# Patient Record
Sex: FEMALE | Race: WHITE | URBAN - METROPOLITAN AREA
[De-identification: names, ages, dates, MRNs, and addresses within clinical notes are randomized per-mention and may not be internally consistent; named-entity substitution may affect disease eponyms.]

---

## 2020-02-27 RX ORDER — DEXTROAMPHETAMINE SACCHARATE, AMPHETAMINE ASPARTATE MONOHYDRATE, DEXTROAMPHETAMINE SULFATE AND AMPHETAMINE SULFATE 5; 5; 5; 5 MG/1; MG/1; MG/1; MG/1
20 CAPSULE, EXTENDED RELEASE ORAL 2 TIMES DAILY
COMMUNITY

## 2020-02-27 ASSESSMENT — MIFFLIN-ST. JEOR: SCORE: 1416.74

## 2020-02-28 ENCOUNTER — ANESTHESIA EVENT (OUTPATIENT)
Dept: SURGERY | Facility: AMBULATORY SURGERY CENTER | Age: 25
End: 2020-02-28

## 2020-03-05 ENCOUNTER — HOSPITAL ENCOUNTER (OUTPATIENT)
Facility: AMBULATORY SURGERY CENTER | Age: 25
Discharge: HOME | End: 2020-03-05
Attending: PLASTIC SURGERY | Admitting: PLASTIC SURGERY

## 2020-03-05 ENCOUNTER — ANESTHESIA (OUTPATIENT)
Dept: SURGERY | Facility: AMBULATORY SURGERY CENTER | Age: 25
End: 2020-03-05
Payer: COMMERCIAL

## 2020-03-05 VITALS
DIASTOLIC BLOOD PRESSURE: 82 MMHG | TEMPERATURE: 98 F | HEART RATE: 108 BPM | RESPIRATION RATE: 16 BRPM | BODY MASS INDEX: 19.99 KG/M2 | HEIGHT: 69 IN | SYSTOLIC BLOOD PRESSURE: 125 MMHG | WEIGHT: 135 LBS | OXYGEN SATURATION: 100 %

## 2020-03-05 DIAGNOSIS — T88.59XA NAUSEA AFTER ANESTHESIA, INITIAL ENCOUNTER: ICD-10-CM

## 2020-03-05 DIAGNOSIS — R11.0 NAUSEA AFTER ANESTHESIA, INITIAL ENCOUNTER: ICD-10-CM

## 2020-03-05 DIAGNOSIS — R52 PAIN: Primary | ICD-10-CM

## 2020-03-05 DIAGNOSIS — B99.9 INFECTION: ICD-10-CM

## 2020-03-05 LAB — HCG UR QL: NEGATIVE

## 2020-03-05 PROCEDURE — G8907 PT DOC NO EVENTS ON DISCHARG: HCPCS

## 2020-03-05 PROCEDURE — L8600 IMPLANT BREAST SILICONE/EQ: HCPCS

## 2020-03-05 PROCEDURE — G8916 PT W IV AB GIVEN ON TIME: HCPCS

## 2020-03-05 PROCEDURE — 81025 URINE PREGNANCY TEST: CPT | Performed by: ANESTHESIOLOGY

## 2020-03-05 PROCEDURE — 36000143

## 2020-03-05 DEVICE — IMPLANTABLE DEVICE: Type: IMPLANTABLE DEVICE | Site: BREAST | Status: FUNCTIONAL

## 2020-03-05 RX ORDER — CEFAZOLIN SODIUM 2 G/100ML
2 INJECTION, SOLUTION INTRAVENOUS
Status: COMPLETED | OUTPATIENT
Start: 2020-03-05 | End: 2020-03-05

## 2020-03-05 RX ORDER — OXYCODONE AND ACETAMINOPHEN 5; 325 MG/1; MG/1
1-2 TABLET ORAL EVERY 4 HOURS PRN
Qty: 20 TABLET | Refills: 0
Start: 2020-03-05

## 2020-03-05 RX ORDER — OXYCODONE HYDROCHLORIDE 5 MG/1
5 TABLET ORAL EVERY 4 HOURS PRN
Status: DISCONTINUED | OUTPATIENT
Start: 2020-03-05 | End: 2020-03-06 | Stop reason: HOSPADM

## 2020-03-05 RX ORDER — LIDOCAINE HYDROCHLORIDE 20 MG/ML
INJECTION, SOLUTION INFILTRATION; PERINEURAL PRN
Status: DISCONTINUED | OUTPATIENT
Start: 2020-03-05 | End: 2020-03-05

## 2020-03-05 RX ORDER — OXYCODONE AND ACETAMINOPHEN 5; 325 MG/1; MG/1
2 TABLET ORAL
Status: DISCONTINUED | OUTPATIENT
Start: 2020-03-05 | End: 2020-03-06 | Stop reason: HOSPADM

## 2020-03-05 RX ORDER — DIAZEPAM 10 MG
10 TABLET ORAL EVERY 12 HOURS PRN
Status: DISCONTINUED
Start: 2020-03-05 | End: 2020-03-06 | Stop reason: HOSPADM

## 2020-03-05 RX ORDER — MEPERIDINE HYDROCHLORIDE 25 MG/ML
12.5 INJECTION INTRAMUSCULAR; INTRAVENOUS; SUBCUTANEOUS
Status: DISCONTINUED | OUTPATIENT
Start: 2020-03-05 | End: 2020-03-06 | Stop reason: HOSPADM

## 2020-03-05 RX ORDER — GLYCOPYRROLATE 0.2 MG/ML
INJECTION, SOLUTION INTRAMUSCULAR; INTRAVENOUS PRN
Status: DISCONTINUED | OUTPATIENT
Start: 2020-03-05 | End: 2020-03-05

## 2020-03-05 RX ORDER — DEXAMETHASONE SODIUM PHOSPHATE 4 MG/ML
10 INJECTION, SOLUTION INTRA-ARTICULAR; INTRALESIONAL; INTRAMUSCULAR; INTRAVENOUS; SOFT TISSUE
Status: DISCONTINUED | OUTPATIENT
Start: 2020-03-05 | End: 2020-03-06 | Stop reason: HOSPADM

## 2020-03-05 RX ORDER — LIDOCAINE 40 MG/G
CREAM TOPICAL
Status: DISCONTINUED | OUTPATIENT
Start: 2020-03-05 | End: 2020-03-06 | Stop reason: HOSPADM

## 2020-03-05 RX ORDER — ONDANSETRON 4 MG/1
4 TABLET, ORALLY DISINTEGRATING ORAL
Status: DISCONTINUED | OUTPATIENT
Start: 2020-03-05 | End: 2020-03-06 | Stop reason: HOSPADM

## 2020-03-05 RX ORDER — FENTANYL CITRATE 50 UG/ML
INJECTION, SOLUTION INTRAMUSCULAR; INTRAVENOUS PRN
Status: DISCONTINUED | OUTPATIENT
Start: 2020-03-05 | End: 2020-03-05

## 2020-03-05 RX ORDER — CEPHALEXIN 500 MG/1
500 CAPSULE ORAL 2 TIMES DAILY
Qty: 28 CAPSULE | Refills: 0
Start: 2020-03-05 | End: 2020-03-12

## 2020-03-05 RX ORDER — HYDROXYZINE HYDROCHLORIDE 25 MG/1
25 TABLET, FILM COATED ORAL
Status: DISCONTINUED | OUTPATIENT
Start: 2020-03-05 | End: 2020-03-06 | Stop reason: HOSPADM

## 2020-03-05 RX ORDER — PROPOFOL 10 MG/ML
INJECTION, EMULSION INTRAVENOUS PRN
Status: DISCONTINUED | OUTPATIENT
Start: 2020-03-05 | End: 2020-03-05

## 2020-03-05 RX ORDER — KETAMINE HYDROCHLORIDE 10 MG/ML
INJECTION, SOLUTION INTRAMUSCULAR; INTRAVENOUS PRN
Status: DISCONTINUED | OUTPATIENT
Start: 2020-03-05 | End: 2020-03-05

## 2020-03-05 RX ORDER — SODIUM CHLORIDE, SODIUM LACTATE, POTASSIUM CHLORIDE, CALCIUM CHLORIDE 600; 310; 30; 20 MG/100ML; MG/100ML; MG/100ML; MG/100ML
INJECTION, SOLUTION INTRAVENOUS CONTINUOUS PRN
Status: DISCONTINUED | OUTPATIENT
Start: 2020-03-05 | End: 2020-03-05

## 2020-03-05 RX ORDER — ONDANSETRON 2 MG/ML
4 INJECTION INTRAMUSCULAR; INTRAVENOUS EVERY 30 MIN PRN
Status: DISCONTINUED | OUTPATIENT
Start: 2020-03-05 | End: 2020-03-06 | Stop reason: HOSPADM

## 2020-03-05 RX ORDER — SODIUM CHLORIDE, SODIUM LACTATE, POTASSIUM CHLORIDE, CALCIUM CHLORIDE 600; 310; 30; 20 MG/100ML; MG/100ML; MG/100ML; MG/100ML
INJECTION, SOLUTION INTRAVENOUS CONTINUOUS
Status: DISCONTINUED | OUTPATIENT
Start: 2020-03-05 | End: 2020-03-06 | Stop reason: HOSPADM

## 2020-03-05 RX ORDER — ACETAMINOPHEN 325 MG/1
975 TABLET ORAL ONCE
Status: COMPLETED | OUTPATIENT
Start: 2020-03-05 | End: 2020-03-05

## 2020-03-05 RX ORDER — GABAPENTIN 300 MG/1
300 CAPSULE ORAL
Status: COMPLETED | OUTPATIENT
Start: 2020-03-05 | End: 2020-03-05

## 2020-03-05 RX ORDER — ONDANSETRON 4 MG/1
8 TABLET, ORALLY DISINTEGRATING ORAL EVERY 8 HOURS PRN
Qty: 4 TABLET | Refills: 0
Start: 2020-03-05

## 2020-03-05 RX ORDER — FENTANYL CITRATE 50 UG/ML
25-50 INJECTION, SOLUTION INTRAMUSCULAR; INTRAVENOUS
Status: DISCONTINUED | OUTPATIENT
Start: 2020-03-05 | End: 2020-03-06 | Stop reason: HOSPADM

## 2020-03-05 RX ORDER — ONDANSETRON 2 MG/ML
INJECTION INTRAMUSCULAR; INTRAVENOUS PRN
Status: DISCONTINUED | OUTPATIENT
Start: 2020-03-05 | End: 2020-03-05

## 2020-03-05 RX ORDER — ONDANSETRON 4 MG/1
4 TABLET, ORALLY DISINTEGRATING ORAL EVERY 30 MIN PRN
Status: DISCONTINUED | OUTPATIENT
Start: 2020-03-05 | End: 2020-03-06 | Stop reason: HOSPADM

## 2020-03-05 RX ORDER — ACETAMINOPHEN 500 MG
500-1000 TABLET ORAL EVERY 6 HOURS PRN
COMMUNITY

## 2020-03-05 RX ORDER — HYDROMORPHONE HYDROCHLORIDE 1 MG/ML
.3-.5 INJECTION, SOLUTION INTRAMUSCULAR; INTRAVENOUS; SUBCUTANEOUS EVERY 10 MIN PRN
Status: DISCONTINUED | OUTPATIENT
Start: 2020-03-05 | End: 2020-03-06 | Stop reason: HOSPADM

## 2020-03-05 RX ORDER — DEXAMETHASONE SODIUM PHOSPHATE 4 MG/ML
INJECTION, SOLUTION INTRA-ARTICULAR; INTRALESIONAL; INTRAMUSCULAR; INTRAVENOUS; SOFT TISSUE PRN
Status: DISCONTINUED | OUTPATIENT
Start: 2020-03-05 | End: 2020-03-05

## 2020-03-05 RX ORDER — NALOXONE HYDROCHLORIDE 0.4 MG/ML
.1-.4 INJECTION, SOLUTION INTRAMUSCULAR; INTRAVENOUS; SUBCUTANEOUS
Status: DISCONTINUED | OUTPATIENT
Start: 2020-03-05 | End: 2020-03-06 | Stop reason: HOSPADM

## 2020-03-05 RX ORDER — PROPOFOL 10 MG/ML
INJECTION, EMULSION INTRAVENOUS CONTINUOUS PRN
Status: DISCONTINUED | OUTPATIENT
Start: 2020-03-05 | End: 2020-03-05

## 2020-03-05 RX ORDER — HYDROXYZINE PAMOATE 25 MG/1
25 CAPSULE ORAL EVERY 6 HOURS PRN
Qty: 30 CAPSULE | Refills: 0
Start: 2020-03-05

## 2020-03-05 RX ORDER — CEFAZOLIN SODIUM 1 G/3ML
1 INJECTION, POWDER, FOR SOLUTION INTRAMUSCULAR; INTRAVENOUS SEE ADMIN INSTRUCTIONS
Status: DISCONTINUED | OUTPATIENT
Start: 2020-03-05 | End: 2020-03-06 | Stop reason: HOSPADM

## 2020-03-05 RX ADMIN — Medication 30 MG: at 08:17

## 2020-03-05 RX ADMIN — KETAMINE HYDROCHLORIDE 75 MG: 10 INJECTION, SOLUTION INTRAMUSCULAR; INTRAVENOUS at 08:30

## 2020-03-05 RX ADMIN — CEFAZOLIN SODIUM 1 G: 2 INJECTION, SOLUTION INTRAVENOUS at 09:35

## 2020-03-05 RX ADMIN — Medication 50 MG: at 07:27

## 2020-03-05 RX ADMIN — ONDANSETRON 4 MG: 2 INJECTION INTRAMUSCULAR; INTRAVENOUS at 11:03

## 2020-03-05 RX ADMIN — PROPOFOL 200 MG: 10 INJECTION, EMULSION INTRAVENOUS at 07:27

## 2020-03-05 RX ADMIN — SODIUM CHLORIDE, SODIUM LACTATE, POTASSIUM CHLORIDE, CALCIUM CHLORIDE: 600; 310; 30; 20 INJECTION, SOLUTION INTRAVENOUS at 09:23

## 2020-03-05 RX ADMIN — DEXAMETHASONE SODIUM PHOSPHATE 10 MG: 4 INJECTION, SOLUTION INTRA-ARTICULAR; INTRALESIONAL; INTRAMUSCULAR; INTRAVENOUS; SOFT TISSUE at 07:38

## 2020-03-05 RX ADMIN — SODIUM CHLORIDE, SODIUM LACTATE, POTASSIUM CHLORIDE, CALCIUM CHLORIDE: 600; 310; 30; 20 INJECTION, SOLUTION INTRAVENOUS at 07:00

## 2020-03-05 RX ADMIN — FENTANYL CITRATE 50 MCG: 50 INJECTION, SOLUTION INTRAMUSCULAR; INTRAVENOUS at 07:27

## 2020-03-05 RX ADMIN — ACETAMINOPHEN 975 MG: 325 TABLET ORAL at 06:48

## 2020-03-05 RX ADMIN — Medication 30 MG: at 07:51

## 2020-03-05 RX ADMIN — Medication 30 MG: at 08:48

## 2020-03-05 RX ADMIN — GABAPENTIN 300 MG: 300 CAPSULE ORAL at 06:55

## 2020-03-05 RX ADMIN — LIDOCAINE HYDROCHLORIDE 3 ML: 20 INJECTION, SOLUTION INFILTRATION; PERINEURAL at 07:27

## 2020-03-05 RX ADMIN — OXYCODONE HYDROCHLORIDE 5 MG: 5 TABLET ORAL at 11:24

## 2020-03-05 RX ADMIN — CEFAZOLIN SODIUM 2 G: 2 INJECTION, SOLUTION INTRAVENOUS at 07:39

## 2020-03-05 RX ADMIN — Medication 10 MG: at 10:04

## 2020-03-05 RX ADMIN — ONDANSETRON 4 MG: 2 INJECTION INTRAMUSCULAR; INTRAVENOUS at 07:40

## 2020-03-05 RX ADMIN — FENTANYL CITRATE 50 MCG: 50 INJECTION, SOLUTION INTRAMUSCULAR; INTRAVENOUS at 07:44

## 2020-03-05 RX ADMIN — Medication 30 MG: at 09:32

## 2020-03-05 RX ADMIN — GLYCOPYRROLATE 0.2 MG: 0.2 INJECTION, SOLUTION INTRAMUSCULAR; INTRAVENOUS at 08:30

## 2020-03-05 RX ADMIN — SODIUM CHLORIDE, SODIUM LACTATE, POTASSIUM CHLORIDE, CALCIUM CHLORIDE: 600; 310; 30; 20 INJECTION, SOLUTION INTRAVENOUS at 07:06

## 2020-03-05 RX ADMIN — PROPOFOL 200 MCG/KG/MIN: 10 INJECTION, EMULSION INTRAVENOUS at 07:28

## 2020-03-05 NOTE — ANESTHESIA CARE TRANSFER NOTE
Patient: Gabriela Shay    Procedure(s):  REMOVE AND REPLACE BREAST IMPLANTS, PLICATION OF POCKETS    Diagnosis: Encounter for cosmetic procedure [Z41.1]  Diagnosis Additional Information: No value filed.    Anesthesia Type:   General     Note:  Airway :Face Mask  Patient transferred to:PACU  Handoff Report: Identifed the Patient, Identified the Reponsible Provider, Reviewed the pertinent medical history, Discussed the surgical course, Reviewed Intra-OP anesthesia mangement and issues during anesthesia, Set expectations for post-procedure period and Allowed opportunity for questions and acknowledgement of understanding      Vitals: (Last set prior to Anesthesia Care Transfer)    CRNA VITALS  3/5/2020 1028 - 3/5/2020 1103      3/5/2020             Resp Rate (observed):  12                Electronically Signed By: RAMIRO Brambila CRNA  March 5, 2020  11:03 AM

## 2020-03-05 NOTE — BRIEF OP NOTE
[facility] Brief Operative Note    Pre-operative diagnosis: Lateralization and Malposition of breast implants; cosmetic nasal deformity   Post-operative diagnosis same   Procedure: Procedure(s):  REMOVE AND REPLACE BREAST IMPLANTS, PLICATION OF POCKETS   Surgeon(s): Surgeon(s) and Role:     * Bert Peralta MD - Primary   Estimated blood loss:  30ml    Specimens: none   Findings: The left breast implant malposition was significantly difficult to correct and took longer than anticipated. I elected not to perform the rhinoplasty portion of the operation as the time would have been too long in total.   Assist: Claudia Estes  Condition: extubated and stable to PAR    Bert Peralta MD

## 2020-03-05 NOTE — ANESTHESIA POSTPROCEDURE EVALUATION
Anesthesia POST Procedure Evaluation    Patient: Gabriela Shay   MRN:     [id number] Gender:   female   Age:    24 year old :      1995        Preoperative Diagnosis: Encounter for cosmetic procedure [Z41.1]   Procedure(s):  REMOVE AND REPLACE BREAST IMPLANTS, PLICATION OF POCKETS   Postop Comments: No value filed.     Anesthesia Type: General       Disposition: Outpatient   Postop Pain Control: Uneventful            Sign Out: Well controlled pain   PONV: No   Neuro/Psych: Uneventful            Sign Out: Acceptable/Baseline neuro status   Airway/Respiratory: Uneventful            Sign Out: Acceptable/Baseline resp. status   CV/Hemodynamics: Uneventful            Sign Out: Acceptable CV status   Other NRE: NONE   DID A NON-ROUTINE EVENT OCCUR? No    Event details/Postop Comments:  Patient doing well post-operatively.  No significant issues.  Hemodynamically stable, pain well controlled, nausea well controlled.  Stable for discharge from the PACU           Last Anesthesia Record Vitals:  CRNA VITALS  3/5/2020 1028 - 3/5/2020 1128      3/5/2020             Resp Rate (observed):  10          Last PACU Vitals:  Vitals Value Taken Time   /59 3/5/2020 11:00 AM   Temp 36.7  C (98  F) 3/5/2020 10:55 AM   Pulse 124 3/5/2020 11:00 AM   Resp 18 3/5/2020 11:01 AM   SpO2 100 % 3/5/2020 11:01 AM   Temp src     NIBP     Pulse 124 3/5/2020 10:56 AM   SpO2 100 % 3/5/2020 10:56 AM   Resp     Temp     Ht Rate     Temp 2     Vitals shown include unvalidated device data.      Electronically Signed By: Messi Dick MD, 2020, 1:43 PM

## 2020-03-05 NOTE — ANESTHESIA PREPROCEDURE EVALUATION
"Anesthesia Pre-Procedure Evaluation    Patient: Gabriela Shay   MRN:     [id number] Gender:   female   Age:    24 year old :      1995        Preoperative Diagnosis: Encounter for cosmetic procedure [Z41.1]   Procedure(s):  REMOVE AND REPLACE BREAST IMPLANTS, PLICATION OF POCKETS  RHINOPLASTY     LABS:  CBC: No results found for: WBC, HGB, HCT, PLT  BMP: No results found for: NA, POTASSIUM, CHLORIDE, CO2, BUN, CR, GLC  COAGS: No results found for: PTT, INR, FIBR  POC: No results found for: BGM, HCG, HCGS  OTHER: No results found for: PH, LACT, A1C, HEAVENLY, PHOS, MAG, ALBUMIN, PROTTOTAL, ALT, AST, GGT, ALKPHOS, BILITOTAL, BILIDIRECT, LIPASE, AMYLASE, JULIANA, TSH, T4, T3, CRP, SED     Preop Vitals    BP Readings from Last 3 Encounters:   No data found for BP    Pulse Readings from Last 3 Encounters:   No data found for Pulse      Resp Readings from Last 3 Encounters:   No data found for Resp    SpO2 Readings from Last 3 Encounters:   No data found for SpO2      Temp Readings from Last 1 Encounters:   No data found for Temp    Ht Readings from Last 1 Encounters:   20 1.753 m (5' 9\")      Wt Readings from Last 1 Encounters:   20 61.2 kg (135 lb)    Estimated body mass index is 19.94 kg/m  as calculated from the following:    Height as of this encounter: 1.753 m (5' 9\").    Weight as of this encounter: 61.2 kg (135 lb).     LDA:        History reviewed. No pertinent past medical history.   Past Surgical History:   Procedure Laterality Date     BREAST SURGERY Bilateral 2012    Augmentation     ENT SURGERY Bilateral     tonsillectomy     HEAD & NECK SURGERY      Jaw      Allergies   Allergen Reactions     Sulfa Drugs         Anesthesia Evaluation     . Pt has had prior anesthetic.     No history of anesthetic complications          ROS/MED HX    ENT/Pulmonary:  - neg pulmonary ROS     Neurologic:  - neg neurologic ROS     Cardiovascular:  - neg cardiovascular ROS       METS/Exercise Tolerance:  >4 " METS   Hematologic:  - neg hematologic  ROS       Musculoskeletal:  - neg musculoskeletal ROS       GI/Hepatic:  - neg GI/hepatic ROS       Renal/Genitourinary:  - ROS Renal section negative       Endo:  - neg endo ROS       Psychiatric:  - neg psychiatric ROS       Infectious Disease:  - neg infectious disease ROS       Malignancy:         Other:                         PHYSICAL EXAM:   Mental Status/Neuro: A/A/O   Airway: Facies: Feasible  Mallampati: I  Mouth/Opening: Full  TM distance: > 6 cm  Neck ROM: Full   Respiratory: Auscultation: CTAB     Resp. Rate: Normal     Resp. Effort: Normal      CV: Rhythm: Regular  Rate: Age appropriate  Heart: Normal Sounds  Edema: None   Comments:      Dental: Normal Dentition                Assessment:   ASA SCORE: 1    H&P: History and physical reviewed and following examination; no interval change.    NPO Status: NPO Appropriate     Plan:   Anes. Type:  General   Pre-Medication: None   Induction:  IV (Standard)   Airway: ETT; Oral (Given surgery performing rhinoplasty as well)   Access/Monitoring: PIV   Maintenance: Balanced     Postop Plan:   Postop Pain: Opioids  Postop Sedation/Airway: Not planned  Disposition: Outpatient     PONV Management:   Adult Risk Factors: Female, Postop Opioids   Prevention: Ondansetron, Dexamethasone     CONSENT: Direct conversation   Plan and risks discussed with: Patient   Blood Products: Consented (ALL Blood Products)                   Messi Dick MD

## 2020-03-05 NOTE — DISCHARGE INSTRUCTIONS
[facility]  Same-Day Surgery   Adult Discharge Orders & Instructions   For 24 hours after surgery  1. Get plenty of rest.  A responsible adult must stay with you for at least 24 hours after you leave the hospital.   2. Do not drive or use heavy equipment.  If you have weakness or tingling, don't drive or use heavy equipment until this feeling goes away.  3. Do not drink alcohol.  4. Avoid strenuous or risky activities.  Ask for help when climbing stairs.   5. You may feel lightheaded.  IF so, sit for a few minutes before standing.  Have someone help you get up.   6. If you have nausea (feel sick to your stomach): Drink only clear liquids such as apple juice, ginger ale, broth or 7-Up.  Rest may also help.  Be sure to drink enough fluids.  Move to a regular diet as you feel able.  7. You may have a slight fever. Call the doctor if your fever is over 100 F (37.7 C) (taken under the tongue) or lasts longer than 24 hours.  8. You may have a dry mouth, a sore throat, muscle aches or trouble sleeping.  These should go away after 24 hours.  9. Do not make important or legal decisions.   Call your doctor for any of the followin.  Signs of infection (fever, growing tenderness at the surgery site, a large amount of drainage or bleeding, severe pain, foul-smelling drainage, redness, swelling).    2. It has been over 8 to 10 hours since surgery and you are still not able to urinate (pass water).    3.  Headache for over 24 hours.    4.  Numbness, tingling or weakness the day after surgery (if you had spinal anesthesia).  To contact a doctor, call ___________________________     1 oxycodone given at 11:30am

## 2020-03-06 NOTE — OP NOTE
Procedure Date: 03/05/2020      LOCATION:  [facility]      PREOPERATIVE DIAGNOSES:   1. History of previous breast augmentation performed by Dr. Hair Villalobos of [place], [place] on 06/06/2012.  This breast augmentation is a submuscular breast augmentation performed with Allergan, style 15, moderate profile, 339 mL smooth round gel breast implants.   2. Breast implant malposition with lateralization of breast pockets, left greater than right.   3. Bottoming out of left breast implant.   4. Cosmetic nasal deformity.      POSTOPERATIVE DIAGNOSES:   1. History of previous breast augmentation performed by Hair Villalobos of [place], [place] on 06/06/2012.  This breast augmentation was a submuscular breast augmentation performed with Allergan, style 15, moderate profile, 339 mL smooth round gel breast implants.   2. Breast implant malposition with lateralization of breast pockets, left greater than right.   3. Bottoming out of left breast implant.   4. Cosmetic nasal deformity.      OPERATION PERFORMED:   1. Removal and replacement of breast implants with plication of the pockets, reformation of the lateral aspect of right and left breasts and re-creation of left inframammary fold.  Breast implants removed were seen to be completely intact.  The replacement breast implants:  Left breast --  [place] smooth round high profile XTRA 490 mL [place] MemoryGel breast implant.  Left breast reference number [id number], lot #[id number], and serial number [id number].  Right breast implant --  [place] smooth round high profile XTRA 490 mL MemoryGel breast implants.  Right breast reference number [id number], lot #[id number], and serial number [id number].   2. Plication of lateral border and inframammary bold by horizontal mattressing suture technique for correction of implant malposition.   3. It should be noted that due to the complicated nature of the correction of  the implant malposition, I elected NOT to proceed with the rhinoplasty portion of the operation.  Her rhinoplasty is quite complicated and will take at least 4 hours in surgical time.  Six hours of time is scheduled, and this is felt to be a safe duration of outpatient surgery.  At the end of the correction of the implant malposition surgery, it had already run 3 hours in length, meaning that the surgery would take at least 7 hours to complete.  It was decided to not perform the rhinoplasty at this operation.      INDICATIONS:  Gabriela Shay is a very pleasant and attractive 24-year-old female who underwent breast augmentation by Hair Villalobos MD of [place], [place] on 06/06/2012.  He used a allergan style 15, 339 mL, moderate profile, smooth gel breast implants.  To examination, the patient has significant breast implant malposition, left greater than right, where the implant falls out to the side.  There is significant bottoming out implant as well.  The patient was told a series of plicating sutures would be necessary to create a base diameter to match her new breast implants.  This correction will be performed using 2-0 PDS sutures in curvilinear fashion to recreate a lateral border of the breast, as well as an inframammary fold.  These 2-0 PDS sutures will take 4-6 months to dissolve.  With regard to breast implants, she was told the risks of the implant include first and foremost the chance of capsular contracture.  Capsular contracture occurs in approximately 2% of my patients, and 98% of my patients will not develop capsular contracture.  The current generation of [place] breast implants do carry a capsular contracture warranty.  The patient was told that breast implants are not a lifetime medical device and that she should expect to have her breast implants replaced within her lifetime.  Her [place] breast implants will carry a lifetime warranty.  The patient was told the risks inherent to breast  augmentation include chance of bleeding, infection, hematoma, seroma, implant malposition, asymmetry, and risk of breast implant failure.  First and foremost, the greatest risk is that of capsular contracture.  Capsular contracture is the leading reason for reoperation in breast surgery using implants.  Maneuvers that are utilized to measure chances of capsular contracture include submuscular breast implant placement.  Submuscular breast implant placement confers a nearly 3-fold reduction rate of capsular contracture compared with subglandular breast augmentation.  The use of inframammary fold incision can confer a 10-fold reduction of capsular contracture compared to using a periareolar incision.  In a study by Ranjeet Martinez MD from [facility], a rate of 9.5% capsular contracture was seen when a periareolar incision was used.  The rate of capsular contracture was lowered to 0.59% when inframammary fold incision was used.  For the last 5 years, I have been using a Eaton funnel for placement of the implants, which allows for a no-touch technique when placing the implants which should diminish the chance of capsular contracture.  I have used breast implant pocket irrigations consisting of Ancef, gentamicin and Betadine throughout my surgical career.  The Betadine is always rinsed until clear.  Studies by Andre Nation MD from [facility] document the efficacy of breast implant pocket irrigations.  The patient is aware of the phenomenon of silent rupture where the implant may fail, and neither the surgeon nor patient is aware of this.  I recommend MRI scan to check on the intactness of the implant as a means of surveillance at 10 years postoperatively.  The patient was given a chance to ask questions, and all were answered.  The patient provides her informed consent for the procedure.      PROCEDURE AND FINDINGS:  The patient was taken to the operating room and placed in  supine position on the operating room table.  The area of lateralization and bottoming out was marked in the preoperative holding area, as well as the location of her proper inframammary fold and lateral breast border.  Once in the operating room, a successful general endotracheal anesthetic was then induced.  The patient's chest was then prepped and draped in routine sterile fashion.  The nipple-areolar complexes were covered with 3M Tegaderm dressings to keep them completely out of the operative field.  Incision was made on the right side, excising the existing scar from Dr. Villalobos's surgery.  The capsule was entered using a Brown-Kristinon forceps and Metzenbaum scissors.  The [place] 339 mL implant was delivered and was completely intact.  I formed a new base diameter of 12.5 cm in diameter.  A needle was placed at the desired lateral border of the breast and inframammary fold, and this was transferred into the breast pocket was marked and the internal aspect of the capsule.  A series of these points follow a curvilinear border of the lateral breast when this was transferred to the patient's chest wall.  The capsule between the 2 areas was then removed.  A curvilinear series of 2-0 PDS sutures in horizontal mattressing fashion was placed to create a new lateral border of the breast and a new inframammary fold.  I then scored the breast capsule in longitudinal and latitudinal direction to accommodate a larger breast implant.  The sizer was then reinserted.  This guided me as far as attenuation of medial fibers of pectoralis major muscle.  I then reinserted the sizer, and this gave very nice look to her breast.  The breast pocket was irrigated with solution consisting Ancef, gentamicin, bacitracin and Betadine.  Betadine was rinsed until clear.  At this point, all operative personnel changed their gloves.  The implant was opened on the back table and soaked in antibiotic solution.  An iNPLANT funnel was then opened and  trimmed to the appropriate size for a 490 mL implant.  The funnel was lubricated with Techni-Care and the antibiotic solution.  The implant was then transferred from the sterile container to the funnel and inserted using a no-touch technique.  Closure consisted of 3-0 Vicryl sutures in the capsular layer.  Deep parenchymal sutures were placed using 4-0 PDS suture.  The skin was closed using 4-0 Monocryl suture in buried interrupted deep dermal fashion.  The skin was then run using 4-0 Prolene suture in running and buried continuous intracuticular fashion.  Attention was directed to the left side where the identical operation was then performed.  On the left side, I had a great deal of difficulty obtaining a natural appearing lateral border of the breast.  The inframammary fold was relatively easy to fix.  The lateralization was extremely difficult to fix.  I replaced my sutures 3 times in order to obtain a natural appearing lateral border to her breast.  At this point, all operative personnel again changed their gloves.  The funnel was again lubricated with Techni-Care.  The implant was emptied from its sterile container to the funnel and inserted using a no-touch technique.  Closure was identical from left to right.  Dressing consisted of Mastisol and half-inch Steri-Strips over the inframammary fold incisions followed by a surgical bra.      ESTIMATED BLOOD LOSS:  Less than 50 mL.      COMPLICATIONS:  None.      CONDITION:  Extubated and stable to postanesthesia recovery.      NOTE:  It was decided not to perform the patient's rhinoplasty today.  The breast augmentation and took 3 hours to complete.  The patient has paid for 6 hours of surgery.  Actual surgical operative time was exactly 3 hours, from 7:45 a.m. to 10:45 a.m.  The patient had 3 hours of an unused surgical operative time, which she paid for the procedure.            CECIL ADAMS MD             D: 03/06/2020   T: 03/06/2020   MT: ANDREA      Name:      KEILA DIAZ   MRN:      [id number]        Account:        [id number]   :      1995           Procedure Date: 2020      Document: [id number]

## 2020-03-16 ENCOUNTER — HOSPITAL ENCOUNTER (OUTPATIENT)
Facility: AMBULATORY SURGERY CENTER | Age: 25
End: 2020-03-16
Attending: PLASTIC SURGERY | Admitting: PLASTIC SURGERY

## 2020-06-10 DIAGNOSIS — Z11.59 ENCOUNTER FOR SCREENING FOR OTHER VIRAL DISEASES: Primary | ICD-10-CM

## 2020-06-25 ENCOUNTER — ANESTHESIA EVENT (OUTPATIENT)
Dept: SURGERY | Facility: AMBULATORY SURGERY CENTER | Age: 25
End: 2020-06-25

## 2020-07-03 DIAGNOSIS — Z11.59 ENCOUNTER FOR SCREENING FOR OTHER VIRAL DISEASES: ICD-10-CM

## 2020-07-03 PROCEDURE — 99207 ZZC NO BILLABLE SERVICE THIS VISIT: CPT

## 2020-07-03 PROCEDURE — U0003 INFECTIOUS AGENT DETECTION BY NUCLEIC ACID (DNA OR RNA); SEVERE ACUTE RESPIRATORY SYNDROME CORONAVIRUS 2 (SARS-COV-2) (CORONAVIRUS DISEASE [COVID-19]), AMPLIFIED PROBE TECHNIQUE, MAKING USE OF HIGH THROUGHPUT TECHNOLOGIES AS DESCRIBED BY CMS-2020-01-R: HCPCS | Performed by: PLASTIC SURGERY

## 2020-07-04 LAB
SARS-COV-2 RNA SPEC QL NAA+PROBE: NOT DETECTED
SPECIMEN SOURCE: NORMAL

## 2020-07-06 ENCOUNTER — ANESTHESIA (OUTPATIENT)
Dept: SURGERY | Facility: AMBULATORY SURGERY CENTER | Age: 25
End: 2020-07-06
Payer: COMMERCIAL

## 2020-07-06 ENCOUNTER — HOSPITAL ENCOUNTER (OUTPATIENT)
Facility: AMBULATORY SURGERY CENTER | Age: 25
Discharge: HOME | End: 2020-07-06
Attending: PLASTIC SURGERY | Admitting: PLASTIC SURGERY

## 2020-07-06 VITALS
RESPIRATION RATE: 16 BRPM | BODY MASS INDEX: 20.46 KG/M2 | TEMPERATURE: 98 F | OXYGEN SATURATION: 99 % | SYSTOLIC BLOOD PRESSURE: 127 MMHG | WEIGHT: 135 LBS | DIASTOLIC BLOOD PRESSURE: 74 MMHG | HEART RATE: 86 BPM | HEIGHT: 68 IN

## 2020-07-06 DIAGNOSIS — T88.59XA NAUSEA AFTER ANESTHESIA, INITIAL ENCOUNTER: ICD-10-CM

## 2020-07-06 DIAGNOSIS — R52 PAIN: Primary | ICD-10-CM

## 2020-07-06 DIAGNOSIS — B99.9 INFECTION: ICD-10-CM

## 2020-07-06 DIAGNOSIS — U07.1 INFECTION DUE TO 2019 NOVEL CORONAVIRUS: ICD-10-CM

## 2020-07-06 DIAGNOSIS — R11.0 NAUSEA AFTER ANESTHESIA, INITIAL ENCOUNTER: ICD-10-CM

## 2020-07-06 LAB — HCG UR QL: NEGATIVE

## 2020-07-06 PROCEDURE — 81025 URINE PREGNANCY TEST: CPT | Performed by: ANESTHESIOLOGY

## 2020-07-06 PROCEDURE — 36000144 ZZH SURGERY LEVEL COSMETIC 240 MIN

## 2020-07-06 PROCEDURE — G8916 PT W IV AB GIVEN ON TIME: HCPCS

## 2020-07-06 PROCEDURE — G8907 PT DOC NO EVENTS ON DISCHARG: HCPCS

## 2020-07-06 PROCEDURE — 36000152 ZZH SURGERY LEVEL COSMETIC EACH ADDL 30 MIN OVER QUOTE ESTIMATE

## 2020-07-06 PROCEDURE — 36000148

## 2020-07-06 RX ORDER — SODIUM CHLORIDE, SODIUM LACTATE, POTASSIUM CHLORIDE, CALCIUM CHLORIDE 600; 310; 30; 20 MG/100ML; MG/100ML; MG/100ML; MG/100ML
INJECTION, SOLUTION INTRAVENOUS CONTINUOUS
Status: DISCONTINUED | OUTPATIENT
Start: 2020-07-06 | End: 2020-07-07 | Stop reason: HOSPADM

## 2020-07-06 RX ORDER — LIDOCAINE 40 MG/G
CREAM TOPICAL
Status: DISCONTINUED | OUTPATIENT
Start: 2020-07-06 | End: 2020-07-07 | Stop reason: HOSPADM

## 2020-07-06 RX ORDER — BUPIVACAINE HYDROCHLORIDE AND EPINEPHRINE 2.5; 5 MG/ML; UG/ML
INJECTION, SOLUTION EPIDURAL; INFILTRATION; INTRACAUDAL; PERINEURAL PRN
Status: DISCONTINUED | OUTPATIENT
Start: 2020-07-06 | End: 2020-07-06 | Stop reason: HOSPADM

## 2020-07-06 RX ORDER — CEFAZOLIN SODIUM 2 G/100ML
2 INJECTION, SOLUTION INTRAVENOUS
Status: COMPLETED | OUTPATIENT
Start: 2020-07-06 | End: 2020-07-06

## 2020-07-06 RX ORDER — ALBUTEROL SULFATE 0.83 MG/ML
2.5 SOLUTION RESPIRATORY (INHALATION) EVERY 4 HOURS PRN
Status: DISCONTINUED | OUTPATIENT
Start: 2020-07-06 | End: 2020-07-07 | Stop reason: HOSPADM

## 2020-07-06 RX ORDER — FENTANYL CITRATE 50 UG/ML
25-50 INJECTION, SOLUTION INTRAMUSCULAR; INTRAVENOUS
Status: DISCONTINUED | OUTPATIENT
Start: 2020-07-06 | End: 2020-07-07 | Stop reason: HOSPADM

## 2020-07-06 RX ORDER — ONDANSETRON 2 MG/ML
INJECTION INTRAMUSCULAR; INTRAVENOUS PRN
Status: DISCONTINUED | OUTPATIENT
Start: 2020-07-06 | End: 2020-07-06

## 2020-07-06 RX ORDER — HYDROXYZINE PAMOATE 25 MG/1
25 CAPSULE ORAL EVERY 6 HOURS PRN
Qty: 30 CAPSULE | Refills: 0
Start: 2020-07-06

## 2020-07-06 RX ORDER — ONDANSETRON 4 MG/1
4 TABLET, ORALLY DISINTEGRATING ORAL
Status: DISCONTINUED | OUTPATIENT
Start: 2020-07-06 | End: 2020-07-07 | Stop reason: HOSPADM

## 2020-07-06 RX ORDER — DEXAMETHASONE SODIUM PHOSPHATE 4 MG/ML
4 INJECTION, SOLUTION INTRA-ARTICULAR; INTRALESIONAL; INTRAMUSCULAR; INTRAVENOUS; SOFT TISSUE EVERY 10 MIN PRN
Status: DISCONTINUED | OUTPATIENT
Start: 2020-07-06 | End: 2020-07-07 | Stop reason: HOSPADM

## 2020-07-06 RX ORDER — HYDROMORPHONE HYDROCHLORIDE 1 MG/ML
.3-.5 INJECTION, SOLUTION INTRAMUSCULAR; INTRAVENOUS; SUBCUTANEOUS EVERY 10 MIN PRN
Status: DISCONTINUED | OUTPATIENT
Start: 2020-07-06 | End: 2020-07-07 | Stop reason: HOSPADM

## 2020-07-06 RX ORDER — OXYCODONE AND ACETAMINOPHEN 5; 325 MG/1; MG/1
2 TABLET ORAL
Status: DISCONTINUED | OUTPATIENT
Start: 2020-07-06 | End: 2020-07-07 | Stop reason: HOSPADM

## 2020-07-06 RX ORDER — ONDANSETRON 2 MG/ML
4 INJECTION INTRAMUSCULAR; INTRAVENOUS EVERY 30 MIN PRN
Status: DISCONTINUED | OUTPATIENT
Start: 2020-07-06 | End: 2020-07-07 | Stop reason: HOSPADM

## 2020-07-06 RX ORDER — ONDANSETRON 4 MG/1
4 TABLET, ORALLY DISINTEGRATING ORAL EVERY 30 MIN PRN
Status: DISCONTINUED | OUTPATIENT
Start: 2020-07-06 | End: 2020-07-07 | Stop reason: HOSPADM

## 2020-07-06 RX ORDER — ACETAMINOPHEN 325 MG/1
975 TABLET ORAL ONCE
Status: COMPLETED | OUTPATIENT
Start: 2020-07-06 | End: 2020-07-06

## 2020-07-06 RX ORDER — FENTANYL CITRATE 50 UG/ML
INJECTION, SOLUTION INTRAMUSCULAR; INTRAVENOUS PRN
Status: DISCONTINUED | OUTPATIENT
Start: 2020-07-06 | End: 2020-07-06

## 2020-07-06 RX ORDER — LIDOCAINE HYDROCHLORIDE AND EPINEPHRINE 10; 10 MG/ML; UG/ML
INJECTION, SOLUTION INFILTRATION; PERINEURAL PRN
Status: DISCONTINUED | OUTPATIENT
Start: 2020-07-06 | End: 2020-07-06 | Stop reason: HOSPADM

## 2020-07-06 RX ORDER — GINSENG 100 MG
CAPSULE ORAL PRN
Status: DISCONTINUED | OUTPATIENT
Start: 2020-07-06 | End: 2020-07-06 | Stop reason: HOSPADM

## 2020-07-06 RX ORDER — CEFAZOLIN SODIUM 1 G/3ML
1 INJECTION, POWDER, FOR SOLUTION INTRAMUSCULAR; INTRAVENOUS SEE ADMIN INSTRUCTIONS
Status: DISCONTINUED | OUTPATIENT
Start: 2020-07-06 | End: 2020-07-07 | Stop reason: HOSPADM

## 2020-07-06 RX ORDER — GABAPENTIN 300 MG/1
300 CAPSULE ORAL ONCE
Status: COMPLETED | OUTPATIENT
Start: 2020-07-06 | End: 2020-07-06

## 2020-07-06 RX ORDER — METOPROLOL TARTRATE 1 MG/ML
1-2 INJECTION, SOLUTION INTRAVENOUS EVERY 5 MIN PRN
Status: DISCONTINUED | OUTPATIENT
Start: 2020-07-06 | End: 2020-07-07 | Stop reason: HOSPADM

## 2020-07-06 RX ORDER — LABETALOL HYDROCHLORIDE 5 MG/ML
INJECTION, SOLUTION INTRAVENOUS PRN
Status: DISCONTINUED | OUTPATIENT
Start: 2020-07-06 | End: 2020-07-06

## 2020-07-06 RX ORDER — DEXAMETHASONE SODIUM PHOSPHATE 4 MG/ML
10 INJECTION, SOLUTION INTRA-ARTICULAR; INTRALESIONAL; INTRAMUSCULAR; INTRAVENOUS; SOFT TISSUE
Status: COMPLETED | OUTPATIENT
Start: 2020-07-06 | End: 2020-07-06

## 2020-07-06 RX ORDER — HYDROXYZINE HYDROCHLORIDE 25 MG/1
25 TABLET, FILM COATED ORAL
Status: DISCONTINUED | OUTPATIENT
Start: 2020-07-06 | End: 2020-07-07 | Stop reason: HOSPADM

## 2020-07-06 RX ORDER — PROPOFOL 10 MG/ML
INJECTION, EMULSION INTRAVENOUS PRN
Status: DISCONTINUED | OUTPATIENT
Start: 2020-07-06 | End: 2020-07-06

## 2020-07-06 RX ORDER — ESMOLOL HYDROCHLORIDE 10 MG/ML
INJECTION INTRAVENOUS PRN
Status: DISCONTINUED | OUTPATIENT
Start: 2020-07-06 | End: 2020-07-06

## 2020-07-06 RX ORDER — MEPERIDINE HYDROCHLORIDE 25 MG/ML
12.5 INJECTION INTRAMUSCULAR; INTRAVENOUS; SUBCUTANEOUS
Status: DISCONTINUED | OUTPATIENT
Start: 2020-07-06 | End: 2020-07-07 | Stop reason: HOSPADM

## 2020-07-06 RX ORDER — CEPHALEXIN 500 MG/1
500 CAPSULE ORAL 2 TIMES DAILY
Qty: 28 CAPSULE | Refills: 0
Start: 2020-07-06 | End: 2020-07-13

## 2020-07-06 RX ORDER — SCOLOPAMINE TRANSDERMAL SYSTEM 1 MG/1
1 PATCH, EXTENDED RELEASE TRANSDERMAL
Status: DISCONTINUED | OUTPATIENT
Start: 2020-07-06 | End: 2020-07-07 | Stop reason: HOSPADM

## 2020-07-06 RX ORDER — HYDRALAZINE HYDROCHLORIDE 20 MG/ML
2.5-5 INJECTION INTRAMUSCULAR; INTRAVENOUS EVERY 10 MIN PRN
Status: DISCONTINUED | OUTPATIENT
Start: 2020-07-06 | End: 2020-07-07 | Stop reason: HOSPADM

## 2020-07-06 RX ORDER — PROPOFOL 10 MG/ML
INJECTION, EMULSION INTRAVENOUS CONTINUOUS PRN
Status: DISCONTINUED | OUTPATIENT
Start: 2020-07-06 | End: 2020-07-06

## 2020-07-06 RX ORDER — OXYCODONE HYDROCHLORIDE 5 MG/1
10 TABLET ORAL EVERY 4 HOURS PRN
Status: DISCONTINUED | OUTPATIENT
Start: 2020-07-06 | End: 2020-07-07 | Stop reason: HOSPADM

## 2020-07-06 RX ORDER — ONDANSETRON 4 MG/1
4-8 TABLET, ORALLY DISINTEGRATING ORAL EVERY 8 HOURS PRN
Qty: 4 TABLET | Refills: 0
Start: 2020-07-06

## 2020-07-06 RX ORDER — OXYCODONE AND ACETAMINOPHEN 5; 325 MG/1; MG/1
1-2 TABLET ORAL EVERY 4 HOURS PRN
Qty: 20 TABLET | Refills: 0
Start: 2020-07-06

## 2020-07-06 RX ORDER — COCAINE HYDROCHLORIDE 40 MG/ML
SOLUTION NASAL PRN
Status: DISCONTINUED | OUTPATIENT
Start: 2020-07-06 | End: 2020-07-06 | Stop reason: HOSPADM

## 2020-07-06 RX ORDER — LIDOCAINE HYDROCHLORIDE 20 MG/ML
INJECTION, SOLUTION INFILTRATION; PERINEURAL PRN
Status: DISCONTINUED | OUTPATIENT
Start: 2020-07-06 | End: 2020-07-06

## 2020-07-06 RX ORDER — NALOXONE HYDROCHLORIDE 0.4 MG/ML
.1-.4 INJECTION, SOLUTION INTRAMUSCULAR; INTRAVENOUS; SUBCUTANEOUS
Status: DISCONTINUED | OUTPATIENT
Start: 2020-07-06 | End: 2020-07-07 | Stop reason: HOSPADM

## 2020-07-06 RX ORDER — PHYSOSTIGMINE SALICYLATE 1 MG/ML
1.2 INJECTION INTRAVENOUS
Status: DISCONTINUED | OUTPATIENT
Start: 2020-07-06 | End: 2020-07-07 | Stop reason: HOSPADM

## 2020-07-06 RX ADMIN — LABETALOL HYDROCHLORIDE 10 MG: 5 INJECTION, SOLUTION INTRAVENOUS at 10:28

## 2020-07-06 RX ADMIN — FENTANYL CITRATE 25 MCG: 50 INJECTION, SOLUTION INTRAMUSCULAR; INTRAVENOUS at 11:44

## 2020-07-06 RX ADMIN — PROPOFOL 250 MCG/KG/MIN: 10 INJECTION, EMULSION INTRAVENOUS at 07:05

## 2020-07-06 RX ADMIN — SODIUM CHLORIDE, SODIUM LACTATE, POTASSIUM CHLORIDE, CALCIUM CHLORIDE: 600; 310; 30; 20 INJECTION, SOLUTION INTRAVENOUS at 06:57

## 2020-07-06 RX ADMIN — FENTANYL CITRATE 50 MCG: 50 INJECTION, SOLUTION INTRAMUSCULAR; INTRAVENOUS at 07:13

## 2020-07-06 RX ADMIN — Medication 20 MG: at 08:20

## 2020-07-06 RX ADMIN — SODIUM CHLORIDE, SODIUM LACTATE, POTASSIUM CHLORIDE, CALCIUM CHLORIDE: 600; 310; 30; 20 INJECTION, SOLUTION INTRAVENOUS at 09:24

## 2020-07-06 RX ADMIN — FENTANYL CITRATE 25 MCG: 50 INJECTION, SOLUTION INTRAMUSCULAR; INTRAVENOUS at 12:33

## 2020-07-06 RX ADMIN — Medication 30 MG: at 07:04

## 2020-07-06 RX ADMIN — ACETAMINOPHEN 975 MG: 325 TABLET ORAL at 06:12

## 2020-07-06 RX ADMIN — SCOLOPAMINE TRANSDERMAL SYSTEM 1 PATCH: 1 PATCH, EXTENDED RELEASE TRANSDERMAL at 06:50

## 2020-07-06 RX ADMIN — GABAPENTIN 300 MG: 300 CAPSULE ORAL at 06:12

## 2020-07-06 RX ADMIN — FENTANYL CITRATE 25 MCG: 50 INJECTION, SOLUTION INTRAMUSCULAR; INTRAVENOUS at 07:04

## 2020-07-06 RX ADMIN — CEFAZOLIN SODIUM 1 G: 2 INJECTION, SOLUTION INTRAVENOUS at 11:13

## 2020-07-06 RX ADMIN — DEXAMETHASONE SODIUM PHOSPHATE 10 MG: 4 INJECTION, SOLUTION INTRA-ARTICULAR; INTRALESIONAL; INTRAMUSCULAR; INTRAVENOUS; SOFT TISSUE at 07:16

## 2020-07-06 RX ADMIN — LABETALOL HYDROCHLORIDE 10 MG: 5 INJECTION, SOLUTION INTRAVENOUS at 10:24

## 2020-07-06 RX ADMIN — SODIUM CHLORIDE, SODIUM LACTATE, POTASSIUM CHLORIDE, CALCIUM CHLORIDE: 600; 310; 30; 20 INJECTION, SOLUTION INTRAVENOUS at 06:19

## 2020-07-06 RX ADMIN — ESMOLOL HYDROCHLORIDE 30 MG: 10 INJECTION INTRAVENOUS at 10:16

## 2020-07-06 RX ADMIN — PROPOFOL 100 MG: 10 INJECTION, EMULSION INTRAVENOUS at 08:05

## 2020-07-06 RX ADMIN — CEFAZOLIN SODIUM 2 G: 2 INJECTION, SOLUTION INTRAVENOUS at 07:10

## 2020-07-06 RX ADMIN — FENTANYL CITRATE 50 MCG: 50 INJECTION, SOLUTION INTRAMUSCULAR; INTRAVENOUS at 09:32

## 2020-07-06 RX ADMIN — CEFAZOLIN SODIUM 1 G: 2 INJECTION, SOLUTION INTRAVENOUS at 09:13

## 2020-07-06 RX ADMIN — Medication 20 MG: at 07:33

## 2020-07-06 RX ADMIN — ESMOLOL HYDROCHLORIDE 20 MG: 10 INJECTION INTRAVENOUS at 09:39

## 2020-07-06 RX ADMIN — ONDANSETRON 4 MG: 2 INJECTION INTRAMUSCULAR; INTRAVENOUS at 11:59

## 2020-07-06 RX ADMIN — PROPOFOL 170 MG: 10 INJECTION, EMULSION INTRAVENOUS at 07:04

## 2020-07-06 RX ADMIN — PROPOFOL 20 MG: 10 INJECTION, EMULSION INTRAVENOUS at 13:04

## 2020-07-06 RX ADMIN — FENTANYL CITRATE 25 MCG: 50 INJECTION, SOLUTION INTRAMUSCULAR; INTRAVENOUS at 07:05

## 2020-07-06 RX ADMIN — PROPOFOL 25 MG: 10 INJECTION, EMULSION INTRAVENOUS at 12:53

## 2020-07-06 RX ADMIN — PROPOFOL 30 MG: 10 INJECTION, EMULSION INTRAVENOUS at 07:05

## 2020-07-06 RX ADMIN — LIDOCAINE HYDROCHLORIDE 60 MG: 20 INJECTION, SOLUTION INFILTRATION; PERINEURAL at 07:04

## 2020-07-06 RX ADMIN — CEFAZOLIN SODIUM 1 G: 2 INJECTION, SOLUTION INTRAVENOUS at 13:02

## 2020-07-06 ASSESSMENT — MIFFLIN-ST. JEOR: SCORE: 1400.86

## 2020-07-06 NOTE — DISCHARGE INSTRUCTIONS
[facility]   Adult Discharge Orders & Instructions     For 24 hours after surgery    1. Get plenty of rest.  A responsible adult must stay with you for at least 24 hours after you leave the hospital.   2. Do not drive or use heavy equipment.  If you have weakness or tingling, don't drive or use heavy equipment until this feeling goes away.  3. Do not drink alcohol.  4. Avoid strenuous or risky activities.  Ask for help when climbing stairs.   5. You may feel lightheaded.  IF so, sit for a few minutes before standing.  Have someone help you get up.   6. If you have nausea (feel sick to your stomach): Drink only clear liquids such as apple juice, ginger ale, broth or 7-Up.  Rest may also help.  Be sure to drink enough fluids.  Move to a regular diet as you feel able.  7. You may have a slight fever. Call the doctor if your fever is over 100 F (37.7 C) (taken under the tongue) or lasts longer than 24 hours.  8. You may have a dry mouth, a sore throat, muscle aches or trouble sleeping.  These should go away after 24 hours.  9. Do not make important or legal decisions.     Call your doctor for any of the followin.  Signs of infection (fever, growing tenderness at the surgery site, a large amount of drainage or bleeding, severe pain, foul-smelling drainage, redness, swelling).    2. It has been over 8 to 10 hours since surgery and you are still not able to urinate (pass water).    3.  Headache for over 24 hours.    4.  Numbness, tingling or weakness the day after surgery (if you had spinal anesthesia).                  5. Signs of Covid-19 infection (temperature over 100 degrees, shortness of breath, cough, loss of taste/smell, generalized body aches, persistent headache,                  chills, sore throat, nausea/vomiting/diarrhea).      To contact Dr Tobin call:    [phone] - Day  [phone] - After hours pager    Tylenol was given at 6:10 am.    Scopalimine Patch placed behind left ear  for nausea relief.  Remove the patch in 24-36 hours.  Dispose in trash and wash hands carefully.

## 2020-07-06 NOTE — ANESTHESIA PREPROCEDURE EVALUATION
"Anesthesia Pre-Procedure Evaluation    Patient: Gabriela Shay   MRN:     [id number] Gender:   female   Age:    24 year old :      1995        Preoperative Diagnosis: Encounter for cosmetic surgery [Z41.1]   Procedure(s):  RHINOPLASTY     LABS:  CBC: No results found for: WBC, HGB, HCT, PLT  BMP: No results found for: NA, POTASSIUM, CHLORIDE, CO2, BUN, CR, GLC  COAGS: No results found for: PTT, INR, FIBR  POC:   Lab Results   Component Value Date    HCG Negative 2020     OTHER: No results found for: PH, LACT, A1C, HEAVENLY, PHOS, MAG, ALBUMIN, PROTTOTAL, ALT, AST, GGT, ALKPHOS, BILITOTAL, BILIDIRECT, LIPASE, AMYLASE, JULIANA, TSH, T4, T3, CRP, SED     Preop Vitals    BP Readings from Last 3 Encounters:   20 (!) 150/95   20 125/82    Pulse Readings from Last 3 Encounters:   20 108      Resp Readings from Last 3 Encounters:   20 20   20 16    SpO2 Readings from Last 3 Encounters:   20 96%   20 100%      Temp Readings from Last 1 Encounters:   20 97.9  F (36.6  C) (Temporal)    Ht Readings from Last 1 Encounters:   20 1.727 m (5' 8\")      Wt Readings from Last 1 Encounters:   20 61.2 kg (135 lb)    Estimated body mass index is 20.53 kg/m  as calculated from the following:    Height as of this encounter: 1.727 m (5' 8\").    Weight as of this encounter: 61.2 kg (135 lb).     LDA:  Peripheral IV 20 Left Hand (Active)   Number of days: 123       Peripheral IV 20 Left Hand (Active)   Site Assessment WDL 20   Line Status Infusing 20   Phlebitis Scale 0-->no symptoms 20   Infiltration Scale 0 20   Infiltration Site Treatment Method  None 20   Extravasation? No 20   Dressing Intervention New dressing  20   Number of days: 0        Past Medical History:   Diagnosis Date     Motion sickness       Past Surgical History:   Procedure Laterality Date     BREAST SURGERY " Bilateral 2012    Augmentation     ENT SURGERY Bilateral     tonsillectomy     HEAD & NECK SURGERY  2011    Jaw     REMOVE AND REPLACE BREAST IMPLANT PROSTHESIS Bilateral 3/5/2020    Procedure: REMOVE AND REPLACE BREAST IMPLANTS, PLICATION OF POCKETS;  Surgeon: Bert Peralta MD;  Location: [facility] OR      Allergies   Allergen Reactions     Sulfa Drugs Rash        Anesthesia Evaluation     . Pt has had prior anesthetic. Type: General    History of anesthetic complications   - PONV        ROS/MED HX    ENT/Pulmonary:  - neg pulmonary ROS     Neurologic:  - neg neurologic ROS     Cardiovascular:  - neg cardiovascular ROS       METS/Exercise Tolerance:     Hematologic:  - neg hematologic  ROS       Musculoskeletal:  - neg musculoskeletal ROS       GI/Hepatic:  - neg GI/hepatic ROS       Renal/Genitourinary:  - ROS Renal section negative       Endo:  - neg endo ROS       Psychiatric:  - neg psychiatric ROS       Infectious Disease:  - neg infectious disease ROS       Malignancy:      - no malignancy   Other:    (+) No chance of pregnancy   - neg other ROS                     PHYSICAL EXAM:   Mental Status/Neuro: A/A/O   Airway: Facies: Feasible  Mallampati: I  Mouth/Opening: Full  TM distance: > 6 cm  Neck ROM: Full   Respiratory: Auscultation: CTAB     Resp. Rate: Normal     Resp. Effort: Normal      CV: Rhythm: Regular  Rate: Age appropriate  Heart: Normal Sounds  Edema: None   Comments:      Dental: Normal Dentition                Assessment:   ASA SCORE: 1    H&P: History and physical reviewed and following examination; no interval change.   Smoking Status:  Non-Smoker/Unknown   NPO Status: NPO Appropriate     Plan:   Anes. Type:  General   Pre-Medication: None   Induction:  IV (Standard)   Airway: ETT; Oral   Access/Monitoring: PIV   Maintenance: TIVA     Postop Plan:   Postop Pain: Opioids  Postop Sedation/Airway: Not planned     PONV Management:   Adult Risk Factors: Female, Non-Smoker, Postop Opioids    Prevention: Ondansetron, Dexamethasone, No Volatiles     CONSENT: Direct conversation   Plan and risks discussed with: Patient   Blood Products: Consent Deferred (Minimal Blood Loss)                   Messi Díaz MD

## 2020-07-06 NOTE — BRIEF OP NOTE
[facility] Brief Operative Note    Pre-operative diagnosis: Cosmetic nasal deformity   Post-operative diagnosis same   Procedure: Procedure(s):  RHINOPLASTY   Surgeon(s): Surgeon(s) and Role:     * Bert Peralta MD - Primary   Estimated blood loss: 400ml    Specimens: none   Findings: none   Assist: desiree Estes  Condition: extubated and stable to PAR    Bert Peralta MD

## 2020-07-06 NOTE — ANESTHESIA POSTPROCEDURE EVALUATION
Anesthesia POST Procedure Evaluation    Patient: Gabriela Shay   MRN:     [id number] Gender:   female   Age:    24 year old :      1995        Preoperative Diagnosis: Encounter for cosmetic surgery [Z41.1]   Procedure(s):  RHINOPLASTY   Postop Comments: No value filed.     Anesthesia Type: General       Disposition: Outpatient   Postop Pain Control: Uneventful            Sign Out: Well controlled pain   PONV: No   Neuro/Psych: Uneventful            Sign Out: Acceptable/Baseline neuro status   Airway/Respiratory: Uneventful            Sign Out: Acceptable/Baseline resp. status   CV/Hemodynamics: Uneventful            Sign Out: Acceptable CV status   Other NRE: NONE   DID A NON-ROUTINE EVENT OCCUR? No         Last Anesthesia Record Vitals:  CRNA VITALS  2020 1252 - 2020 1352      2020             Pulse:  108    SpO2:  100 %    Resp Rate (observed):  (!) 1          Last PACU Vitals:  Vitals Value Taken Time   /80 2020  2:00 PM   Temp 99  F (37.2  C) 2020  1:26 PM   Pulse 98 2020  2:00 PM   Resp 11 2020  2:00 PM   SpO2 95 % 2020  2:00 PM   Temp src     NIBP     Pulse     SpO2     Resp     Temp     Ht Rate     Temp 2           Electronically Signed By: Messi Díaz MD, 2020, 2:49 PM

## 2020-08-03 NOTE — OP NOTE
Procedure Date: 07/06/2020      LOCATION:  [facility] [place].      PREOPERATIVE DIAGNOSIS:  Cosmetic nasal deformity.      POSTOPERATIVE DIAGNOSIS:  Cosmetic nasal deformity.      PROCEDURES PERFORMED:  Open septal rhinoplasty, with  grafts, a columellar strut graft, a Joseph tip graft, and narrowing of alar base discrepancy by [place] excision.      SURGEON:  Bert Peralta MD      ANESTHESIA:  General endotracheal.      INDICATIONS FOR PROCEDURE:  The patient is a very pleasant and attractive 24-year-old female who sought our opinion regarding rhinoplasty.  She specifically is bothered by bulbosity of her nasal tip, a nose that is wide through the middle third of her nose, and alar base discrepancy where her alar bases are wider than her intraventricular canthal distance.  Risks of the operation include a chance of bleeding, infection, septal hematoma, hypertrophic scarring, persistent nasal deformity, and potential dissatisfaction with cosmetic outcome.  The patient was told the [place] revision rate with rhinoplasty approach is 15%.  The technique that I will be using is known as an open septorhinoplasty, which allows for complete visualization of the cartilages and nasal bones as possible for nasal deformity.  The technique allows for precision in correcting the anatomic basis for the deformity and is the preferred technique when applying the aforementioned grafts such as  grafts, a columellar strut graft and a Joseph tip graft.  The patient was given a chance to ask questions.  The patient provides images of noses that she finds attractive and we have agreed on the surgical plan.  The patient provides her informed consent for the procedure.      DESCRIPTION OF PROCEDURE:  The patient was taken to the operating room, placed in supine position on the operating room table.  A successful general endotracheal anesthetic was induced using an oral CLINT tube, which was taped in  the midline.  The columella of the nose was then injected with 1% Xylocaine with 1:100,000 epinephrine.  I then injected the tip in transcartilaginous fashion.  A headlight was used for injection of the nasal septum itself.  Externally, where the facial artery crosses the nasal alar groove, I injected again for vasoconstriction.  I then injected up onto the dorsum from the tip to the radix.  4% cocaine on neurosurgical pledgets was then placed in the patient's left nasal vestibule again for septal vasoconstriction.  The face was then prepped and draped in routine sterile fashion.  The eyes were protected using Lacrilube and 3M Tegaderm dressings.  The patient's face was then prepped and draped in routine sterile fashion.  The patient received 2 grams of Ancef and 10 mg of Decadron intravenously prior to commencing the surgery and this was repeated at the 3-hour barrett into surgery.      A transcolumellar stair step incision was made, which was connected to an intranasal incision which followed the caudal margin of the lower lateral cartilages.  The nose was then opened in a T fashion typical for open rhinoplasty.  Once the nose was opened, dissection proceeded with tenotomy scissors following the lower lateral cartilages, then onto the upper lateral cartilages, then onto the cartilaginous and bony dorsum.  A Damien elevator was used to elevate the periosteum at the superior most aspect of the nasal bones themselves.  The specimen was completely intact laterally for control of osteotomies.      Attention was then directed to the nasal septum.  With fiberoptic headlight illumination, a Enzo incision was made over the caudal margin of the septum, first on the patient's left side.  A mucoperichondrial flap was developed medially over the cartilage first on the left side back to the perpendicular plate of the ethmoid.  A 1.5 cm L-shaped strut was left over the caudal septum as well as over the dorsal nasal septum for  support.  A Kiefer elevator was used to develop the right-sided mucoperichondrial flap back to the perpendicular plate of the ethmoid.  A Kiefer elevator was then used to free the septum from its junction from the perpendicular plate of the ethmoid as well as from the crest of the maxilla and vomer.  The intervening piece of cartilage was removed from the nose and placed in a saline-soaked sponge on the back table.         An Aufricht retractor was then used to visualize the dorsum.  A #15 blade was then used to free the upper lateral cartilages from the nasal septum.  I then used the Kiefer elevator intranasally to connect the 2 dissections.  The Kiefer elevator was then used to dissect the mucoperichondrium from the undersides of both of the upper lateral cartilages.  This was done to allow for placement of  grafts.  The cartilaginous septum was lowered using a serrated septal scissor.  The patient had relatively short nasal bones, and it was not necessary to remove any sort of dorsal nasal hump.       grafts were then fashioned on the back table approximately 3 mm in height and 2.5 cm in length.  They were sutured on each side of the nasal septum and in between the upper lateral cartilages to make a sandwich with tissue layers are as follows:  Upper lateral cartilage,  graft, septum,  graft and upper lateral cartilage.  They were sewn in place using 4-0 PDS sutures in a horizontal mattressing fashion.        At this point, attention was directed to the tip.  A caliper was used to measure a 6 mm rim strip.  Then, 6 mm of lower lateral cartilage was left in place.  A tenotomy scissor was used to dissect the cephalic margin of upper lateral cartilage and was excised, leaving the scroll intact.  I then placed a columellar strut graft, dissecting between the medial crura of the lower lateral cartilages to create a pocket for placement of this columellar strut graft.  The columellar strut  graft was shaped in hockey stick fashion approximately 4 mm in width and 2.5 cm in length.  This was fixed between the medial crura of the lower lateral cartilages using 25-gauge needles.  Mattressing type sutures of 4-0 PDS were used to hold the columellar strut graft between the 2 medial crura of the lower lateral cartilages.  Next, an infratip lobular suture was placed in a mattressing fashion to determine the angle of splay of the lower lateral cartilages.  Next, the mucosa was dissected free from the domes of the lower lateral cartilages using a Storz stitch scissors.  Tip defining sutures were then placed using 5-0 PDS suture in mattressing type fashion.  A dome gathering suture was placed using 4-0 PDS suture in mattressing type fashion.  The dorsum was left approximately 6 mm higher than the cartilaginous dorsum of the nose to avoid a supertip deformity.      Attention was then directed to the osteotomies.  The lateral aspect of the nose was injected with 0.25% Xylocaine with 1:100,000 epinephrine, which was allowed to remain in place for approximately 10 minutes.  During this time, I also injected the ala for alar Fernandez excisions.  Attention was then directed back to the osteotomies.  Lateral osteotomies were performed using a 2-0 osteotome in percutaneous perforating fashion.  Using a saline-soaked sponge, I then performed infracture of the patient's bony nasal pyramid to narrow her middle third and close the open roof deformity.  The perichondrium controlled the osteotomies and this allowed us to gently bend the bone inward.  The area was then copiously irrigated with normal saline.  Irrigated the nose with topical thrombin for hemostasis.      At this point, I fashioned a Joseph tip graft.  The Joseph tip graft was sewn in place using 5-0 PDS suture in interrupted fashion.      At this point, I replaced the remaining cartilage back within the mucoperichondrial sleeves, where it could be used if there is  ever any need for revision surgery.      Closure of the nose began with closure of the transcolumellar stair step incision with 6-0 Monocryl sutures followed by 6-0 Prolene sutures in simple interrupted fashion.  At the corners of the flap, I secured them with 6-0 chromic gut suture in interrupted fashion.  In the space between my Prolene suture and chromic gut suture, I placed a single suture of 5-0 plain fast absorbing gut suture.  The remainder of the open rhinoplasty incisions were closed using 5-0 chromic gut suture in simple interrupted fashion.      Next, the septum was copiously irrigated with saline and then the septal access incision was closed using 4-0 chromic gut suture in simple interrupted fashion.      At this point, [place] excisions were performed.  The actual alar cheek junction was left in place, as this is extremely difficult to replicate anatomically.  I stayed approximately 1 mm above this and made symmetric [place] excisions.  Closure of the [place] excisions was performed using 5-0 Monocryl suture in buried interrupted intracuticular fashion.  I then placed 6-0 Prolene sutures for the final closure of the [place] excision in simple interrupted.  I made an incision beneath her lip for dissection of septi depressor muscle, and this was stripped from the septum and a portion of muscle removed so as to avoid having her tip pulled down with smile.  Closure of the intraoral incision consisted of 4-0 Vicryl sutures in the muscular layer followed by 4-0 chromic gut suture in the lip.      At this point, the patient's nose was coated with Mastisol.  3M Steri-Strips were placed along the length of the nose and they met at the tip.  I then used a [place] nasal splint and used the Steri-Strips provided over the dorsum of the nose.  The [place] nasal splint was then fashioned to the proper configuration over an Aufricht retractor.  The adhesive was removed from the back of the splint and splint placed.  The internal  aspect of the nose was then covered with Vaseline.  A nasal drip pad was then placed, which was held in place with 3M paper tape.  The patient's eyelids were iced starting in the operating room, with ice placed in surgical gloves.      ESTIMATED BLOOD LOSS:  25 mL      COMPLICATIONS:  None.      CONDITION:  Stable to postanesthesia recovery.         CECIL ADAMS MD             D: 2020   T: 2020   MT: SHAKA      Name:     KEILA DIAZ   MRN:      [id number]        Account:        [id number]   :      1995           Procedure Date: 2020      Document: [id number]

## 2021-01-04 ENCOUNTER — HEALTH MAINTENANCE LETTER (OUTPATIENT)
Age: 26
End: 2021-01-04

## 2021-10-10 ENCOUNTER — HEALTH MAINTENANCE LETTER (OUTPATIENT)
Age: 26
End: 2021-10-10

## 2022-01-30 ENCOUNTER — HEALTH MAINTENANCE LETTER (OUTPATIENT)
Age: 27
End: 2022-01-30

## 2022-09-18 ENCOUNTER — HEALTH MAINTENANCE LETTER (OUTPATIENT)
Age: 27
End: 2022-09-18

## 2023-05-08 ENCOUNTER — HEALTH MAINTENANCE LETTER (OUTPATIENT)
Age: 28
End: 2023-05-08

## (undated) DEVICE — ESU ELEC BLADE 2.75" COATED/INSULATED E1455

## (undated) DEVICE — DRAPE BREAST/CHEST 29420

## (undated) DEVICE — SU PROLENE 4-0 PS-2 18" 8682G

## (undated) DEVICE — SU PDS II 5-0 P-3 18" Z493G

## (undated) DEVICE — MARKER SKIN DOUBLE TIP W/FLEXI-RULER W/LABELS

## (undated) DEVICE — SYR EAR BULB 3OZ 0035830

## (undated) DEVICE — DRSG TEGADERM 2 3/8X2 3/4" 1624W

## (undated) DEVICE — BLADE KNIFE SURG 15 371115

## (undated) DEVICE — ESU PENCIL SMOKE EVAC W/ROCKER SWITCH 0703-047-000

## (undated) DEVICE — SUCTION CANISTER MEDIVAC LINER 1500ML W/LID 65651-515

## (undated) DEVICE — APPLICATORS COTTON TIP 6" X10

## (undated) DEVICE — GLOVE PROTEXIS BLUE W/NEU-THERA 7.0  2D73EB70

## (undated) DEVICE — GLOVE PROTEXIS W/NEU-THERA 6.5  2D73TE65

## (undated) DEVICE — SYR 10ML LL W/O NDL

## (undated) DEVICE — DRSG STERI STRIP 1/2X4" R1547

## (undated) DEVICE — PREP CHLORAPREP 26ML TINTED ORANGE  260815

## (undated) DEVICE — SU PDS II 2-0 SH 27" Z317H

## (undated) DEVICE — SU PLAIN 4-0 SC-1 18" 1824H

## (undated) DEVICE — SU MONOCRYL 6-0 P-3 18" UND Y492G

## (undated) DEVICE — SPECIMEN CONTAINER 4OZ

## (undated) DEVICE — BLADE KNIFE SURG 15C 371716

## (undated) DEVICE — ADH LIQUID MASTISOL TOPICAL VIAL 2-3ML 0523-48

## (undated) DEVICE — SU CHROMIC 4-0 RB-1 27" U203H

## (undated) DEVICE — SPONGE PACK VAGINAL 2"X9

## (undated) DEVICE — ESU CLEANER TIP 31142717

## (undated) DEVICE — NDL 25GA 1.5" 305127

## (undated) DEVICE — PACK MINOR SBA15MIFSE

## (undated) DEVICE — SUCTION TIP YANKAUER W/O VENT K86

## (undated) DEVICE — DRSG STERI STRIP 1/4X3" R1541

## (undated) DEVICE — GOWN XLG DISP 9545

## (undated) DEVICE — ESU NDL COLORADO MICRO 3CM STR N103A

## (undated) DEVICE — SU VICRYL 3-0 SH 27" J316H

## (undated) DEVICE — BLADE KNIFE BEAVER MICROSHARP BLUE 7514

## (undated) DEVICE — SU PROLENE 6-0 P-1 18" 8697G

## (undated) DEVICE — NDL SPINAL 25GA 3.5" QUINCKE 405180

## (undated) DEVICE — CATH TRAY FOLEY SURESTEP 16FR DRAIN BAG STATOCK A899916

## (undated) DEVICE — DECANTER TRANSFER DEVICE 2008S

## (undated) DEVICE — SYR BULB IRRIG DOVER 60 ML LATEX FREE 67000

## (undated) DEVICE — SPONGE COTTONOID 1/2X3" 80-1407

## (undated) DEVICE — DRSG GAUZE 4X4" TRAY 6939

## (undated) DEVICE — GLOVE PROTEXIS W/NEU-THERA 7.5  2D73TE75

## (undated) DEVICE — SU CHROMIC 5-0 P-3 18" 687G

## (undated) DEVICE — SU PLAIN FAST ABSORB 5-0 PC-1 18" 1915G

## (undated) DEVICE — DRSG KERLIX FLUFFS X5

## (undated) DEVICE — NDL 27GA 1.25" 305136

## (undated) DEVICE — SU PDS II 4-0 P-3 18" Z494G

## (undated) DEVICE — SU VICRYL 5-0 P-3 18" UND J493G

## (undated) DEVICE — DRAPE SPLIT EENT 76X124" 3X28" 9447

## (undated) DEVICE — SU MONOCRYL 4-0 P-3 18" UND Y494G

## (undated) DEVICE — SOL WATER IRRIG 1000ML BOTTLE 07139-09

## (undated) DEVICE — STPL SKIN 35W 054887

## (undated) RX ORDER — GABAPENTIN 300 MG/1
CAPSULE ORAL
Status: DISPENSED
Start: 2020-03-05

## (undated) RX ORDER — CEFAZOLIN SODIUM 2 G/100ML
INJECTION, SOLUTION INTRAVENOUS
Status: DISPENSED
Start: 2020-07-06

## (undated) RX ORDER — PROPOFOL 10 MG/ML
INJECTION, EMULSION INTRAVENOUS
Status: DISPENSED
Start: 2020-03-05

## (undated) RX ORDER — GENTAMICIN 40 MG/ML
INJECTION, SOLUTION INTRAMUSCULAR; INTRAVENOUS
Status: DISPENSED
Start: 2020-03-05

## (undated) RX ORDER — BUPIVACAINE HYDROCHLORIDE 2.5 MG/ML
INJECTION, SOLUTION INFILTRATION; PERINEURAL
Status: DISPENSED
Start: 2020-03-05

## (undated) RX ORDER — TRIAMCINOLONE ACETONIDE 40 MG/ML
INJECTION, SUSPENSION INTRA-ARTICULAR; INTRAMUSCULAR
Status: DISPENSED
Start: 2020-03-05

## (undated) RX ORDER — CEFAZOLIN SODIUM 1 G/3ML
INJECTION, POWDER, FOR SOLUTION INTRAMUSCULAR; INTRAVENOUS
Status: DISPENSED
Start: 2020-07-06

## (undated) RX ORDER — ONDANSETRON 2 MG/ML
INJECTION INTRAMUSCULAR; INTRAVENOUS
Status: DISPENSED
Start: 2020-03-05

## (undated) RX ORDER — BUPIVACAINE HYDROCHLORIDE AND EPINEPHRINE 2.5; 5 MG/ML; UG/ML
INJECTION, SOLUTION EPIDURAL; INFILTRATION; INTRACAUDAL; PERINEURAL
Status: DISPENSED
Start: 2020-07-06

## (undated) RX ORDER — ACETAMINOPHEN 325 MG/1
TABLET ORAL
Status: DISPENSED
Start: 2020-07-06

## (undated) RX ORDER — OXYCODONE HYDROCHLORIDE 5 MG/1
TABLET ORAL
Status: DISPENSED
Start: 2020-03-05

## (undated) RX ORDER — LIDOCAINE HYDROCHLORIDE AND EPINEPHRINE 10; 10 MG/ML; UG/ML
INJECTION, SOLUTION INFILTRATION; PERINEURAL
Status: DISPENSED
Start: 2020-07-06

## (undated) RX ORDER — ONDANSETRON 2 MG/ML
INJECTION INTRAMUSCULAR; INTRAVENOUS
Status: DISPENSED
Start: 2020-07-06

## (undated) RX ORDER — DEXAMETHASONE SODIUM PHOSPHATE 4 MG/ML
INJECTION, SOLUTION INTRA-ARTICULAR; INTRALESIONAL; INTRAMUSCULAR; INTRAVENOUS; SOFT TISSUE
Status: DISPENSED
Start: 2020-03-05

## (undated) RX ORDER — FENTANYL CITRATE 50 UG/ML
INJECTION, SOLUTION INTRAMUSCULAR; INTRAVENOUS
Status: DISPENSED
Start: 2020-07-06

## (undated) RX ORDER — GLYCOPYRROLATE 0.2 MG/ML
INJECTION INTRAMUSCULAR; INTRAVENOUS
Status: DISPENSED
Start: 2020-03-05

## (undated) RX ORDER — CEFAZOLIN SODIUM 1 G/3ML
INJECTION, POWDER, FOR SOLUTION INTRAMUSCULAR; INTRAVENOUS
Status: DISPENSED
Start: 2020-03-05

## (undated) RX ORDER — ACETAMINOPHEN 325 MG/1
TABLET ORAL
Status: DISPENSED
Start: 2020-03-05

## (undated) RX ORDER — LABETALOL HYDROCHLORIDE 5 MG/ML
INJECTION, SOLUTION INTRAVENOUS
Status: DISPENSED
Start: 2020-03-05

## (undated) RX ORDER — EPINEPHRINE 1 MG/ML
INJECTION, SOLUTION INTRAMUSCULAR; SUBCUTANEOUS
Status: DISPENSED
Start: 2020-03-05

## (undated) RX ORDER — FENTANYL CITRATE 50 UG/ML
INJECTION, SOLUTION INTRAMUSCULAR; INTRAVENOUS
Status: DISPENSED
Start: 2020-03-05

## (undated) RX ORDER — GINSENG 100 MG
CAPSULE ORAL
Status: DISPENSED
Start: 2020-03-05

## (undated) RX ORDER — CEFAZOLIN SODIUM 2 G/100ML
INJECTION, SOLUTION INTRAVENOUS
Status: DISPENSED
Start: 2020-03-05

## (undated) RX ORDER — LIDOCAINE HYDROCHLORIDE AND EPINEPHRINE 10; 10 MG/ML; UG/ML
INJECTION, SOLUTION INFILTRATION; PERINEURAL
Status: DISPENSED
Start: 2020-03-05

## (undated) RX ORDER — PROPOFOL 10 MG/ML
INJECTION, EMULSION INTRAVENOUS
Status: DISPENSED
Start: 2020-07-06

## (undated) RX ORDER — KETAMINE HYDROCHLORIDE 50 MG/ML
INJECTION, SOLUTION INTRAMUSCULAR; INTRAVENOUS
Status: DISPENSED
Start: 2020-03-05

## (undated) RX ORDER — ESMOLOL HYDROCHLORIDE 10 MG/ML
INJECTION INTRAVENOUS
Status: DISPENSED
Start: 2020-07-06

## (undated) RX ORDER — SCOLOPAMINE TRANSDERMAL SYSTEM 1 MG/1
PATCH, EXTENDED RELEASE TRANSDERMAL
Status: DISPENSED
Start: 2020-07-06

## (undated) RX ORDER — BALANCED SALT SOLUTION 6.4; .75; .48; .3; 3.9; 1.7 MG/ML; MG/ML; MG/ML; MG/ML; MG/ML; MG/ML
SOLUTION OPHTHALMIC
Status: DISPENSED
Start: 2020-07-06

## (undated) RX ORDER — GABAPENTIN 300 MG/1
CAPSULE ORAL
Status: DISPENSED
Start: 2020-07-06

## (undated) RX ORDER — DEXAMETHASONE SODIUM PHOSPHATE 4 MG/ML
INJECTION, SOLUTION INTRA-ARTICULAR; INTRALESIONAL; INTRAMUSCULAR; INTRAVENOUS; SOFT TISSUE
Status: DISPENSED
Start: 2020-07-06

## (undated) RX ORDER — LABETALOL HYDROCHLORIDE 5 MG/ML
INJECTION, SOLUTION INTRAVENOUS
Status: DISPENSED
Start: 2020-07-06

## (undated) RX ORDER — LIDOCAINE HYDROCHLORIDE 20 MG/ML
INJECTION, SOLUTION INFILTRATION; PERINEURAL
Status: DISPENSED
Start: 2020-03-05

## (undated) RX ORDER — GINSENG 100 MG
CAPSULE ORAL
Status: DISPENSED
Start: 2020-07-06